# Patient Record
Sex: FEMALE | Race: WHITE | URBAN - METROPOLITAN AREA
[De-identification: names, ages, dates, MRNs, and addresses within clinical notes are randomized per-mention and may not be internally consistent; named-entity substitution may affect disease eponyms.]

---

## 2018-02-14 ENCOUNTER — APPOINTMENT (OUTPATIENT)
Dept: URBAN - METROPOLITAN AREA CLINIC 292 | Age: 58
Setting detail: DERMATOLOGY
End: 2018-02-14

## 2018-02-14 DIAGNOSIS — L57.0 ACTINIC KERATOSIS: ICD-10-CM

## 2018-02-14 DIAGNOSIS — L81.4 OTHER MELANIN HYPERPIGMENTATION: ICD-10-CM

## 2018-02-14 DIAGNOSIS — L82.1 OTHER SEBORRHEIC KERATOSIS: ICD-10-CM

## 2018-02-14 PROCEDURE — OTHER LIQUID NITROGEN: OTHER

## 2018-02-14 PROCEDURE — 17003 DESTRUCT PREMALG LES 2-14: CPT

## 2018-02-14 PROCEDURE — OTHER COUNSELING: OTHER

## 2018-02-14 PROCEDURE — 99213 OFFICE O/P EST LOW 20 MIN: CPT | Mod: 25

## 2018-02-14 PROCEDURE — 17000 DESTRUCT PREMALG LESION: CPT

## 2018-02-14 ASSESSMENT — LOCATION ZONE DERM: LOCATION ZONE: ARM

## 2018-02-14 ASSESSMENT — LOCATION DETAILED DESCRIPTION DERM: LOCATION DETAILED: LEFT VENTRAL PROXIMAL FOREARM

## 2018-02-14 ASSESSMENT — LOCATION SIMPLE DESCRIPTION DERM: LOCATION SIMPLE: LEFT FOREARM

## 2018-02-14 NOTE — PROCEDURE: LIQUID NITROGEN
Post-Care Instructions: 1.  After treatment with liquid nitrogen there may be some burning sensation or pain that can last up to 24 hours.  The discomfort can be relieved with extra strength Tylenol or similar pain medications taken as directed.  \\n2.  Within 24 to 48 hours a blister may form containing clear or bloody fluid.  This is expected.  If the blister becomes larger than a nickel you may sánchez it with a sterile needle.\\n3.  Wast the area with soap and water twice daily.\\n4.  Apply a generous amount of Vaseline or Aquaphor ointment to the treated sites.\\n5.  Place a small dressing or Band-Aid over the wound.\\n\\nIf any problems occur, please contact our office at [phone] or go to the nearest Emergency Center. Post-Care Instructions: 1.  After treatment with liquid nitrogen there may be some burning sensation or pain that can last up to 24 hours.  The discomfort can be relieved with extra strength Tylenol or similar pain medications taken as directed.  \\n2.  Within 24 to 48 hours a blister may form containing clear or bloody fluid.  This is expected.  If the blister becomes larger than a nickel you may sánchez it with a sterile needle.\\n3.  Wast the area with soap and water twice daily.\\n4.  Apply a generous amount of Vaseline or Aquaphor ointment to the treated sites.\\n5.  Place a small dressing or Band-Aid over the wound.\\n\\nIf any problems occur, please contact our office at [phone] or go to the nearest Emergency Center.

## 2019-04-15 ENCOUNTER — APPOINTMENT (OUTPATIENT)
Dept: URBAN - METROPOLITAN AREA CLINIC 292 | Age: 59
Setting detail: DERMATOLOGY
End: 2019-04-15

## 2019-04-15 DIAGNOSIS — L82.1 OTHER SEBORRHEIC KERATOSIS: ICD-10-CM

## 2019-04-15 DIAGNOSIS — L81.4 OTHER MELANIN HYPERPIGMENTATION: ICD-10-CM

## 2019-04-15 DIAGNOSIS — L57.0 ACTINIC KERATOSIS: ICD-10-CM

## 2019-04-15 PROCEDURE — 99213 OFFICE O/P EST LOW 20 MIN: CPT | Mod: 25

## 2019-04-15 PROCEDURE — 17003 DESTRUCT PREMALG LES 2-14: CPT

## 2019-04-15 PROCEDURE — OTHER LIQUID NITROGEN: OTHER

## 2019-04-15 PROCEDURE — 17000 DESTRUCT PREMALG LESION: CPT

## 2019-04-15 PROCEDURE — OTHER COUNSELING: OTHER

## 2019-04-15 ASSESSMENT — LOCATION ZONE DERM: LOCATION ZONE: ARM

## 2019-04-15 ASSESSMENT — LOCATION SIMPLE DESCRIPTION DERM: LOCATION SIMPLE: LEFT FOREARM

## 2019-04-15 ASSESSMENT — LOCATION DETAILED DESCRIPTION DERM: LOCATION DETAILED: LEFT VENTRAL PROXIMAL FOREARM

## 2019-04-15 NOTE — PROCEDURE: LIQUID NITROGEN
Post-Care Instructions: 1.  After treatment with liquid nitrogen there may be some burning sensation or pain that can last up to 24 hours.  The discomfort can be relieved with extra strength Tylenol or similar pain medications taken as directed.  \\n2.  Within 24 to 48 hours a blister may form containing clear or bloody fluid.  This is expected.  If the blister becomes larger than a nickel you may sánchez it with a sterile needle.\\n3.  Wast the area with soap and water twice daily.\\n4.  Apply a generous amount of Vaseline or Aquaphor ointment to the treated sites.\\n5.  Place a small dressing or Band-Aid over the wound.\\n\\nIf any problems occur, please contact our office at [phone] or go to the nearest Emergency Center. Post-Care Instructions: 1.  After treatment with liquid nitrogen there may be some burning sensation or pain that can last up to 24 hours.  The discomfort can be relieved with extra strength Tylenol or similar pain medications taken as directed.  \\n2.  Within 24 to 48 hours a blister may form containing clear or bloody fluid.  This is expected.  If the blister becomes larger than a nickel you may sánchez it with a sterile needle.\\n3.  Wast the area with soap and water twice daily.\\n4.  Apply a generous amount of Vaseline or Aquaphor ointment to the treated sites.\\n5.  Place a small dressing or Band-Aid over the wound.\\n\\nIf any problems occur, please contact our office at [phone] or go to the nearest Emergency Center.

## 2022-08-17 NOTE — PROCEDURE: COUNSELING
Detail Level: Zone
Dr Brown: No new findings so no imaging needed. Pain meds here and will send new pain meds to pharmacy
Bcc Infiltrative Histology Text: There were numerous aggregates of basaloid cells demonstrating an infiltrative pattern.